# Patient Record
Sex: FEMALE | Race: BLACK OR AFRICAN AMERICAN | Employment: FULL TIME | URBAN - METROPOLITAN AREA
[De-identification: names, ages, dates, MRNs, and addresses within clinical notes are randomized per-mention and may not be internally consistent; named-entity substitution may affect disease eponyms.]

---

## 2020-11-01 ENCOUNTER — HOSPITAL ENCOUNTER (EMERGENCY)
Age: 25
Discharge: HOME | End: 2020-11-01
Attending: EMERGENCY MEDICINE

## 2020-11-01 VITALS
WEIGHT: 190 LBS | TEMPERATURE: 98.5 F | HEIGHT: 63 IN | SYSTOLIC BLOOD PRESSURE: 152 MMHG | HEART RATE: 78 BPM | DIASTOLIC BLOOD PRESSURE: 100 MMHG | OXYGEN SATURATION: 99 % | BODY MASS INDEX: 33.66 KG/M2 | RESPIRATION RATE: 16 BRPM

## 2020-11-01 DIAGNOSIS — N93.9 ABNORMAL UTERINE BLEEDING: ICD-10-CM

## 2020-11-01 DIAGNOSIS — Z87.42 HISTORY OF OVARIAN CYST: ICD-10-CM

## 2020-11-01 DIAGNOSIS — R10.2 PELVIC PAIN: Primary | ICD-10-CM

## 2020-11-01 LAB
APPEARANCE UR: NORMAL
BILIRUB UR QL: NEGATIVE
COLOR UR: YELLOW
GLUCOSE UR STRIP.AUTO-MCNC: NEGATIVE MG/DL
HCG UR QL: NEGATIVE
HGB UR QL STRIP: NEGATIVE
KETONES UR QL STRIP.AUTO: NEGATIVE MG/DL
LEUKOCYTE ESTERASE UR QL STRIP.AUTO: NEGATIVE
NITRITE UR QL STRIP.AUTO: NEGATIVE
PH UR STRIP: 5.5 [PH] (ref 5–8)
PROT UR STRIP-MCNC: NEGATIVE MG/DL
SP GR UR REFRACTOMETRY: 1.01 (ref 1–1.03)
UROBILINOGEN UR QL STRIP.AUTO: 0.2 EU/DL (ref 0.2–1)

## 2020-11-01 PROCEDURE — 74011250637 HC RX REV CODE- 250/637: Performed by: NURSE PRACTITIONER

## 2020-11-01 PROCEDURE — 81025 URINE PREGNANCY TEST: CPT

## 2020-11-01 PROCEDURE — 99283 EMERGENCY DEPT VISIT LOW MDM: CPT

## 2020-11-01 PROCEDURE — 81003 URINALYSIS AUTO W/O SCOPE: CPT

## 2020-11-01 RX ORDER — NAPROXEN 250 MG/1
500 TABLET ORAL
Status: COMPLETED | OUTPATIENT
Start: 2020-11-01 | End: 2020-11-01

## 2020-11-01 RX ORDER — NAPROXEN 500 MG/1
500 TABLET ORAL
Qty: 20 TAB | Refills: 0 | Status: SHIPPED | OUTPATIENT
Start: 2020-11-01

## 2020-11-01 RX ADMIN — NAPROXEN 500 MG: 250 TABLET ORAL at 11:30

## 2020-11-01 NOTE — ED TRIAGE NOTES
IUD removed on 9/15/2020, had a cycle on 10/1/2020, abdominal pain onset 19 days, was told she had a cyst on her L ovary. Today is being seen for pain all over the abdomen. Denies N/V/D

## 2020-11-01 NOTE — ED NOTES
Pt denies pain at this time. I have reviewed discharge instructions with the patient. The patient verbalized understanding. Pt walked to [facility] and d/c to home.

## 2020-11-01 NOTE — DISCHARGE INSTRUCTIONS
Patient Education        Pelvic Pain: Care Instructions  Your Care Instructions     Pelvic pain, or pain in the lower belly, can have many causes. Often pelvic pain is not serious and gets better in a few days. If your pain continues or gets worse, you may need tests and treatment. Tell your doctor about any new symptoms. These may be signs of a serious problem. Follow-up care is a key part of your treatment and safety. Be sure to make and go to all appointments, and call your doctor if you are having problems. It's also a good idea to know your test results and keep a list of the medicines you take. How can you care for yourself at home? · Rest until you feel better. Lie down, and raise your legs by placing a pillow under your knees. · Drink plenty of fluids. You may find that small, frequent sips are easier on your stomach than if you drink a lot at once. Avoid drinks with carbonation or caffeine, such as soda pop, tea, or coffee. · Try eating several small meals instead of 2 or 3 large ones. Eat mild foods, such as rice, dry toast or crackers, bananas, and applesauce. Avoid fatty and spicy foods, other fruits, and alcohol until 48 hours after your symptoms have gone away. · Take an over-the-counter pain medicine, such as acetaminophen (Tylenol), ibuprofen (Advil, Motrin), or naproxen (Aleve). Read and follow all instructions on the label. · Do not take two or more pain medicines at the same time unless the doctor told you to. Many pain medicines have acetaminophen, which is Tylenol. Too much acetaminophen (Tylenol) can be harmful. · You can put a heating pad, a warm cloth, or moist heat on your belly to relieve pain. When should you call for help?    Call your doctor now or seek immediate medical care if:    · You have a new or higher fever.     · You have unusual vaginal bleeding.     · You have new or worse belly or pelvic pain.     · You have vaginal discharge that has increased in amount or smells bad.   Watch closely for changes in your health, and be sure to contact your doctor if:    · You do not get better as expected. Where can you learn more? Go to http://www.gray.com/  Enter B514 in the search box to learn more about \"Pelvic Pain: Care Instructions. \"  Current as of: November 8, 2019               Content Version: 12.6  © 9928-1467 [employer]. Care instructions adapted under license by [employer] (which disclaims liability or warranty for this information). If you have questions about a medical condition or this instruction, always ask your healthcare professional. [place] [address] any warranty or liability for your use of this information.

## 2020-11-01 NOTE — ED NOTES
Assumed care of pt at this time. Pt resting in bed. Provider Michelle Yarbrough at bedside to see pt. Call bell within reach. Will continue to montior.

## 2020-11-01 NOTE — ED PROVIDER NOTES
EMERGENCY DEPARTMENT HISTORY AND PHYSICAL EXAM    Date: 11/1/2020  Patient Name: Pasquale Otero    History of Presenting Illness     Chief Complaint   Patient presents with    Abdominal Pain         History Provided By: Patient      Additional History (Context): Pasquale Otero is a 64-year-old female with past medical history significant for asthma who presents to the ER with diffuse pelvic pain that she has had for the last 2 to 3 weeks. She does state that she had her IUD removed due to irregular vaginal bleeding on September 15. At that time her OB/GYN did an ultrasound and stated that she had a small cyst on the left side of her ovary. She states she had a menstrual cycle on October 1 that was a little heavier than she was used to and now feels like she could be about to start her menses again. She is just having cramping in the lower belly bilaterally. She denies associated nausea, vomiting, diarrhea, or constipation. She denies any urinary complaints or vaginal discharge. She denies any chance of STD but is concerned she may be pregnant as the only birth control she has been using is condoms occasionally. She has tried Tylenol for pain without much improvement. PCP: Spencer Paz MD    Current Outpatient Medications   Medication Sig Dispense Refill    naproxen (Naprosyn) 500 mg tablet Take 1 Tab by mouth two (2) times daily as needed for Pain for up to 20 doses. 20 Tab 0    albuterol (PROVENTIL HFA, VENTOLIN HFA, PROAIR HFA) 90 mcg/actuation inhaler Take 1 Puff by inhalation every six (6) hours as needed for Wheezing. 1 Inhaler 0    fluticasone (FLONASE) 50 mcg/actuation nasal spray 2 Sprays by Both Nostrils route daily.  1 Bottle 0       Past History     Past Medical History:  Past Medical History:   Diagnosis Date    Asthma     Head ache     Iron (Fe) deficiency anemia     Sickle cell trait (HCC)     Sickle cell trait ([address]        Past Surgical History:  Past Surgical History: Procedure Laterality Date    HX  SECTION         Family History:  History reviewed. No pertinent family history. Social History:  Social History     Tobacco Use    Smoking status: Never Smoker    Smokeless tobacco: Never Used   Substance Use Topics    Alcohol use: No    Drug use: No       Allergies:  No Known Allergies      Review of Systems     Review of Systems   Constitutional: Negative for chills and fever. HENT: Negative for nasal congestion, sore throat, rhinorrhea  Eyes: Negative. Respiratory: negative  cough and negative for shortness of breath. Cardiovascular: Negative for chest pain and palpitations. Gastrointestinal: Positive for bilateral pelvic pain. Negative for constipation, diarrhea, nausea and vomiting. Genitourinary: Negative for difficulty urinating, hematuria, and flank pain. Musculoskeletal: Negative for back pain. Negative for gait problem and neck pain. Skin: Negative for rash. Allergic/Immunologic: Negative. Neurological: Negative for dizziness, weakness, numbness and headaches. Psychiatric/Behavioral: Negative. All other systems reviewed and are negative. All Other Systems Negative  Physical Exam     Vitals:    20 1009   BP: (!) 152/100   Pulse: 78   Resp: 16   Temp: 98.5 °F (36.9 °C)   SpO2: 99%   Weight: 86.2 kg (190 lb)   Height: 5' 3\" (1.6 m)     Physical Exam  Vitals signs and nursing note reviewed. Constitutional:       General: She is not in acute distress. Appearance: Normal appearance. She is well-developed. She is obese. She is not ill-appearing, toxic-appearing or diaphoretic. HENT:      Head: Normocephalic and atraumatic. Nose: Nose normal.      Mouth/Throat:      Mouth: Mucous membranes are moist.      Pharynx: Oropharynx is clear. No pharyngeal swelling or oropharyngeal exudate. Eyes:      General: Lids are normal. Vision grossly intact. No scleral icterus.      Conjunctiva/sclera: Conjunctivae normal.   Neck: Musculoskeletal: Full passive range of motion without pain and normal range of motion. Cardiovascular:      Rate and Rhythm: Normal rate and regular rhythm. Heart sounds: Normal heart sounds. No murmur. No friction rub. No gallop. Pulmonary:      Effort: Pulmonary effort is normal. No respiratory distress. Breath sounds: Normal breath sounds. No stridor. No wheezing, rhonchi or rales. Chest:      Chest wall: No tenderness. Abdominal:      General: Bowel sounds are normal. There is no distension or abdominal bruit. Palpations: Abdomen is soft. There is no shifting dullness, fluid wave, hepatomegaly, splenomegaly, mass or pulsatile mass. Tenderness: There is abdominal tenderness in the suprapubic area. There is no right CVA tenderness, left CVA tenderness, guarding or rebound. Negative signs include Xiao's sign, Rovsing's sign, McBurney's sign, psoas sign and obturator sign. Hernia: No hernia is present. Musculoskeletal: Normal range of motion. Skin:     General: Skin is warm and dry. Capillary Refill: Capillary refill takes less than 2 seconds. Findings: No rash. Neurological:      General: No focal deficit present. Mental Status: She is alert and oriented to person, place, and time. Psychiatric:         Mood and Affect: Mood normal.         Behavior: Behavior normal. Behavior is cooperative.            Diagnostic Study Results     Labs -     Recent Results (from the past 12 hour(s))   URINALYSIS W/ RFLX MICROSCOPIC    Collection Time: 11/01/20 10:14 AM   Result Value Ref Range    Color YELLOW      Appearance TURBID      Specific gravity 1.013 1.005 - 1.030      pH (UA) 5.5 5.0 - 8.0      Protein Negative NEG mg/dL    Glucose Negative NEG mg/dL    Ketone Negative NEG mg/dL    Bilirubin Negative NEG      Blood Negative NEG      Urobilinogen 0.2 0.2 - 1.0 EU/dL    Nitrites Negative NEG      Leukocyte Esterase Negative NEG     HCG URINE, QL    Collection Time: 11/01/20 10:14 AM   Result Value Ref Range    HCG urine, QL Negative NEG         Radiologic Studies -   No orders to display     CT Results  (Last 48 hours)    None        CXR Results  (Last 48 hours)    None            Medical Decision Making   I am the first provider for this patient. I reviewed the vital signs, available nursing notes, past medical history, past surgical history, family history and social history. Vital Signs-Reviewed the patient's vital signs. Records Reviewed: Nursing notes, old medical records and any previous labs, imaging, visits, consultations pertinent to patient care    Procedures:  Procedures      ED Course: Progress Notes, Reevaluation, and Consults:  11:36 AM  Initial assessment performed. The patients presenting problems have been discussed, and they/their family are in agreement with the care plan formulated and outlined with them. I have encouraged them to ask questions as they arise throughout their visit. Provider Notes (Medical Decision Making):   Patient presents ambulatory in no acute distress, well-hydrated, non-toxic in appearance, and afebrile. Blood pressure is elevated. Benign exam of abdomen with very mild pain in the suprapubic region and no peritoneal signs. No palpable masses in the abdomen. No significant pain over the adnexa bilaterally. Urinalysis and hCG are negative. She is tolerating PO well. Patient appears well and comfortable. Doubt acute surgical process. Repeat abdominal exam reveals soft and non-tender abdomen. No new symptoms on re-evaluation. Patient has improvement in pain. Will discharge home with supportive treatment and close follow-up with OB/GYN in 2-3 days. MED RECONCILIATION:  No current facility-administered medications for this encounter. Current Outpatient Medications   Medication Sig    naproxen (Naprosyn) 500 mg tablet Take 1 Tab by mouth two (2) times daily as needed for Pain for up to 20 doses.  albuterol (PROVENTIL HFA, VENTOLIN HFA, PROAIR HFA) 90 mcg/actuation inhaler Take 1 Puff by inhalation every six (6) hours as needed for Wheezing.  fluticasone (FLONASE) 50 mcg/actuation nasal spray 2 Sprays by Both Nostrils route daily. Disposition:  Home in stable condition. DISCHARGE NOTE:     Patient has been reexamined. Patient has no new complaints, changes, or physical findings. Patient demonstrates understanding of current diagnoses and is in agreement with the treatment plan. They are advised that while the likelihood of serious underlying condition is low at this point given the evaluation performed today, we cannot fully rule it out. They are advised to immediately return with any new symptoms or worsening of current condition. Care plan outlined and precautions discussed. Discussed proper way to take medications. Medication use, risk/benefit, side effects and precautions discussed in detail. Discussed treatment plan, return precautions, symptomatic relief, and expected time to improvement. All questions answered. Patient is stable for discharge and outpatient management. Patient is ready to go home. Follow-up Information     Follow up With Specialties Details Why Contact Info    [facility] Department of OB/GYN  Schedule an appointment as soon as possible for a visit Follow-up from the Emergency Department [facility], [address] [facility] [address] [facility] EMERGENCY DEPT Emergency Medicine  As needed, If symptoms worsen [address] [facility].  [phone]          Current Discharge Medication List      START taking these medications    Details   naproxen (Naprosyn) 500 mg tablet Take 1 Tab by mouth two (2) times daily as needed for Pain for up to 20 doses.   Qty: 20 Tab, Refills: 0         CONTINUE these medications which have NOT CHANGED    Details   albuterol (PROVENTIL HFA, VENTOLIN HFA, PROAIR HFA) 90 mcg/actuation inhaler Take 1 Puff by inhalation every six (6) hours as needed for Wheezing. Qty: 1 Inhaler, Refills: 0      fluticasone (FLONASE) 50 mcg/actuation nasal spray 2 Sprays by Both Nostrils route daily. Qty: 1 Bottle, Refills: 0                   Diagnosis     Clinical Impression:   1. Pelvic pain    2. History of ovarian cyst    3. Abnormal uterine bleeding        Dictation disclaimer:  Please note that this dictation was completed with [employer], the computer voice recognition software. Quite often unanticipated grammatical, syntax, homophones, and other interpretive errors are inadvertently transcribed by the computer software. Please disregard these errors. Please excuse any errors that have escaped final proofreading.

## 2021-02-02 ENCOUNTER — APPOINTMENT (OUTPATIENT)
Dept: GENERAL RADIOLOGY | Age: 26
End: 2021-02-02
Attending: EMERGENCY MEDICINE

## 2021-02-02 ENCOUNTER — HOSPITAL ENCOUNTER (EMERGENCY)
Age: 26
Discharge: HOME | End: 2021-02-02
Attending: EMERGENCY MEDICINE

## 2021-02-02 VITALS
TEMPERATURE: 98.2 F | OXYGEN SATURATION: 100 % | SYSTOLIC BLOOD PRESSURE: 127 MMHG | BODY MASS INDEX: 34.2 KG/M2 | DIASTOLIC BLOOD PRESSURE: 80 MMHG | HEART RATE: 65 BPM | RESPIRATION RATE: 16 BRPM | HEIGHT: 63 IN | WEIGHT: 193 LBS

## 2021-02-02 DIAGNOSIS — J20.9 ACUTE BRONCHITIS, UNSPECIFIED ORGANISM: ICD-10-CM

## 2021-02-02 DIAGNOSIS — Z20.822 SUSPECTED 2019 NOVEL CORONAVIRUS INFECTION: Primary | ICD-10-CM

## 2021-02-02 LAB
HCG UR QL: POSITIVE
SARS-COV-2, COV2: NORMAL

## 2021-02-02 PROCEDURE — U0003 INFECTIOUS AGENT DETECTION BY NUCLEIC ACID (DNA OR RNA); SEVERE ACUTE RESPIRATORY SYNDROME CORONAVIRUS 2 (SARS-COV-2) (CORONAVIRUS DISEASE [COVID-19]), AMPLIFIED PROBE TECHNIQUE, MAKING USE OF HIGH THROUGHPUT TECHNOLOGIES AS DESCRIBED BY CMS-2020-01-R: HCPCS

## 2021-02-02 PROCEDURE — 81025 URINE PREGNANCY TEST: CPT

## 2021-02-02 PROCEDURE — 93005 ELECTROCARDIOGRAM TRACING: CPT

## 2021-02-02 PROCEDURE — 74011000250 HC RX REV CODE- 250: Performed by: EMERGENCY MEDICINE

## 2021-02-02 PROCEDURE — 99284 EMERGENCY DEPT VISIT MOD MDM: CPT

## 2021-02-02 PROCEDURE — 71045 X-RAY EXAM CHEST 1 VIEW: CPT

## 2021-02-02 PROCEDURE — 74011250637 HC RX REV CODE- 250/637: Performed by: EMERGENCY MEDICINE

## 2021-02-02 RX ORDER — ALBUTEROL SULFATE 2.5 MG/.5ML
5 SOLUTION RESPIRATORY (INHALATION)
Status: DISCONTINUED | OUTPATIENT
Start: 2021-02-02 | End: 2021-02-02 | Stop reason: HOSPADM

## 2021-02-02 RX ORDER — DEXAMETHASONE SODIUM PHOSPHATE 4 MG/ML
10 INJECTION, SOLUTION INTRA-ARTICULAR; INTRALESIONAL; INTRAMUSCULAR; INTRAVENOUS; SOFT TISSUE
Status: COMPLETED | OUTPATIENT
Start: 2021-02-02 | End: 2021-02-02

## 2021-02-02 RX ORDER — MONTELUKAST SODIUM 10 MG/1
10 TABLET ORAL DAILY
COMMUNITY

## 2021-02-02 RX ORDER — ALBUTEROL SULFATE 90 UG/1
1 AEROSOL, METERED RESPIRATORY (INHALATION)
Qty: 1 INHALER | Refills: 0 | Status: SHIPPED | OUTPATIENT
Start: 2021-02-02

## 2021-02-02 RX ADMIN — ALBUTEROL SULFATE 5 MG: 2.5 SOLUTION RESPIRATORY (INHALATION) at 10:51

## 2021-02-02 RX ADMIN — DEXAMETHASONE SODIUM PHOSPHATE 10 MG: 4 INJECTION, SOLUTION INTRAMUSCULAR; INTRAVENOUS at 10:51

## 2021-02-02 NOTE — Clinical Note
[address] [facility] EMERGENCY DEPT 
[address] 
[address] [facility] [address] [phone] Work/School Note Date: 2/2/2021 To Whom It May concern: 
 
 
Roddy Cavazos was seen and treated today in the emergency room by the following provider(s): 
Attending Provider: Vitaliy Guthrie MD.   
 
Roddy Cavazos is excused from work/school on 02/02/21. She is clear to return to work/school on 02/03/21. Sincerely, Carter Hassan MD

## 2021-02-02 NOTE — ED PROVIDER NOTES
EMERGENCY DEPARTMENT HISTORY AND PHYSICAL EXAM    10:26 AM some crowding and staffing challenges but not bed. Patient seen in the triage room. Date: 2/2/2021  Patient Name: Roddy Cavazos    History of Presenting Illness     Chief Complaint   Patient presents with    Cough    Shortness of Breath    Chest Pain    Generalized Body Aches         History Provided By: patient    Additional History (Context): Roddy Cavazos is a 26 y.o. female presents with suspect she may be a month pregnant, diagnosed with asthma and COPD, has 2 days of chills body aches. Also cough feels short of breath when she walks some chest pain with breathing. She does have exposure to a coworker with coronavirus. No medications tried. Chest pain is mild    Family member with sickle cell she does not have. No history of PEs or ACS. Deloris Blum PCP: Trinidad Diaz MD    Chief Complaint:   Duration:    Timing:    Location:   Quality:   Severity:   Modifying Factors:   Associated Symptoms:       Current Facility-Administered Medications   Medication Dose Route Frequency Provider Last Rate Last Admin    albuterol CONCENTRATE 2.5mg/0.5 mL neb soln  5 mg Nebulization Q20MIN PRN Earl HICKS MD   5 mg at 02/02/21 1051     Current Outpatient Medications   Medication Sig Dispense Refill    montelukast (Singulair) 10 mg tablet Take 10 mg by mouth daily.  albuterol (PROVENTIL HFA, VENTOLIN HFA, PROAIR HFA) 90 mcg/actuation inhaler Take 1 Puff by inhalation every six (6) hours as needed for Wheezing. 1 Inhaler 0    naproxen (Naprosyn) 500 mg tablet Take 1 Tab by mouth two (2) times daily as needed for Pain for up to 20 doses. 20 Tab 0    fluticasone (FLONASE) 50 mcg/actuation nasal spray 2 Sprays by Both Nostrils route daily.  1 Bottle 0       Past History     Past Medical History:  Past Medical History:   Diagnosis Date    Asthma     Head ache     Iron (Fe) deficiency anemia     Sickle cell trait (HCC)     Sickle cell trait [facility])        Past Surgical History:  Past Surgical History:   Procedure Laterality Date    HX  SECTION         Family History:  History reviewed. No pertinent family history. Social History:  Social History     Tobacco Use    Smoking status: Never Smoker    Smokeless tobacco: Never Used   Substance Use Topics    Alcohol use: No    Drug use: No       Allergies:  No Known Allergies      Review of Systems     Review of Systems   Constitutional: Negative for diaphoresis and fever. HENT: Negative for congestion and sore throat. Eyes: Negative for pain and itching. Respiratory: Positive for cough and shortness of breath. Cardiovascular: Negative for chest pain and palpitations. Gastrointestinal: Negative for abdominal pain and diarrhea. Endocrine: Negative for polydipsia and polyuria. Genitourinary: Negative for dysuria and hematuria. Musculoskeletal: Positive for myalgias. Negative for arthralgias. Skin: Negative for rash and wound. Neurological: Negative for seizures and syncope. Hematological: Does not bruise/bleed easily. Psychiatric/Behavioral: Negative for agitation and hallucinations. Physical Exam       Patient Vitals for the past 12 hrs:   Temp Pulse Resp BP SpO2   21 1119    128/74 100 %   21 1019 98.2 °F (36.8 °C) 65 16 134/82 100 %       Physical Exam  Vitals signs and nursing note reviewed. Constitutional:       General: She is in acute distress (Appears tired few coughs). Appearance: She is well-developed. She is obese. HENT:      Head: Normocephalic and atraumatic. Eyes:      General: No scleral icterus. Conjunctiva/sclera: Conjunctivae normal.   Neck:      Musculoskeletal: Normal range of motion and neck supple. Vascular: No JVD. Cardiovascular:      Rate and Rhythm: Normal rate and regular rhythm. Heart sounds: Normal heart sounds.       Comments: 4 intact extremity pulses  Pulmonary:      Effort: Pulmonary effort is normal.      Breath sounds: Wheezing present. Comments: Prolonged expiratory phase  Abdominal:      Palpations: Abdomen is soft. There is no mass. Tenderness: There is no abdominal tenderness. Musculoskeletal: Normal range of motion. Lymphadenopathy:      Cervical: No cervical adenopathy. Skin:     General: Skin is warm and dry. Neurological:      Mental Status: She is alert. Diagnostic Study Results   Labs -  Recent Results (from the past 12 hour(s))   EKG, 12 LEAD, INITIAL    Collection Time: 02/02/21 10:10 AM   Result Value Ref Range    Ventricular Rate 65 BPM    Atrial Rate 65 BPM    P-R Interval 158 ms    QRS Duration 80 ms    Q-T Interval 380 ms    QTC Calculation (Bezet) 395 ms    Calculated P Axis 42 degrees    Calculated R Axis -59 degrees    Calculated T Axis 3 degrees    Diagnosis       Normal sinus rhythm  Left axis deviation  Abnormal ECG  When compared with ECG of 12-JAN-2014 14:36,  Inverted T waves have replaced nonspecific T wave abnormality in Inferior   leads     HCG URINE, QL    Collection Time: 02/02/21 10:26 AM   Result Value Ref Range    HCG urine, QL Positive (A) NEG         Radiologic Studies -   XR CHEST PORT   Final Result      No active cardiopulmonary disease. Xr Chest Port    Result Date: 2/2/2021  EXAM: CHEST RADIOGRAPH CLINICAL INDICATION/HISTORY: Cough chills coronavirus? Possible pregnancy   > Additional: None COMPARISON: 10/28/2015. TECHNIQUE: Portable frontal view of the chest _______________ FINDINGS: SUPPORT DEVICES: None. HEART AND MEDIASTINUM: No appreciable cardiomegaly. Remaining mediastinal contours within normal limits. LUNGS AND PLEURAL SPACES: Clear. No consolidation, mass or effusion. BONY THORAX AND SOFT TISSUES: Unremarkable. _______________     No active cardiopulmonary disease.       Medications ordered:   Medications   albuterol CONCENTRATE 2.5mg/0.5 mL neb soln (5 mg Nebulization Given 2/2/21 1051)   dexamethasone (DECADRON) 4 mg/mL Oral 10 mg (10 mg Oral Given 2/2/21 1051)         Medical Decision Making   Initial Medical Decision Making and DDx:  Twelve-lead EKG, inverted T waves in the inferior leads, sinus rhythm at 65. Strongly suspicious for coronavirus, check pregnancy test, treat for bronchospasm, otherwise viral syndrome. Doubt ACS CHF PE aortic disease. Bacterial pneumonia is possible    ED Course: Progress Notes, Reevaluation, and Consults:     12:35 PM Pt reevaluated at this time. Discussed results and findings, as well as, diagnosis and plan for discharge. Follow up with doctors/services listed. Return to the emergency department for alarming symptoms. Pt verbalizes understanding and agreement with plan. All questions addressed. Received 1 breathing treatment she does feel better listen to her lungs and they are now clear she still feels kind of bad because of the body aches noted positive pregnancy test use Tylenol for any body aches avoid ibuprofen, Decadron will last for 5 days, treat asthma aggressively during pregnancy the same as nonpregnant. She will follow-up with primary care and GYN. Happy with the plan ready for discharge. Work note. I am the first provider for this patient. I reviewed the vital signs, available nursing notes, past medical history, past surgical history, family history and social history. Patient Vitals for the past 12 hrs:   Temp Pulse Resp BP SpO2   02/02/21 1119    128/74 100 %   02/02/21 1019 98.2 °F (36.8 °C) 65 16 134/82 100 %       Vital Signs-Reviewed the patient's vital signs. Pulse Oximetry Analysis, Cardiac Monitor, 12 lead ekg: No hypoxia on room air  Interpreted by the EP. Records Reviewed: Nursing notes reviewed (Time of Review: 10:26 AM)    Procedures:   Critical Care Time:   Aspirin: (was aspirin given for stroke?)    Diagnosis     Clinical Impression:   1. Suspected 2019 novel coronavirus infection    2.  Acute bronchitis, unspecified organism Disposition: Discharged      Follow-up Information     Follow up With Specialties Details Why Contact Info    Nichole Jackson MD Family Medicine In 1 week  [address]  [phone]             Current Discharge Medication List      CONTINUE these medications which have CHANGED    Details   albuterol (PROVENTIL HFA, VENTOLIN HFA, PROAIR HFA) 90 mcg/actuation inhaler Take 1 Puff by inhalation every six (6) hours as needed for Wheezing. Qty: 1 Inhaler, Refills: 0         CONTINUE these medications which have NOT CHANGED    Details   montelukast (Singulair) 10 mg tablet Take 10 mg by mouth daily. naproxen (Naprosyn) 500 mg tablet Take 1 Tab by mouth two (2) times daily as needed for Pain for up to 20 doses. Qty: 20 Tab, Refills: 0      fluticasone (FLONASE) 50 mcg/actuation nasal spray 2 Sprays by Both Nostrils route daily.   Qty: 1 Bottle, Refills: 0           _______________________________    Notes:    Florin Altman MD using Dragon dictation      _______________________________

## 2021-02-02 NOTE — ED NOTES
I have reviewed discharge instructions with the patient. The patient verbalized understanding. Covid swab sent to lab with label.

## 2021-02-03 ENCOUNTER — PATIENT OUTREACH (OUTPATIENT)
Dept: CASE MANAGEMENT | Age: 26
End: 2021-02-03

## 2021-02-03 LAB
ATRIAL RATE: 65 BPM
CALCULATED P AXIS, ECG09: 42 DEGREES
CALCULATED R AXIS, ECG10: -59 DEGREES
CALCULATED T AXIS, ECG11: 3 DEGREES
DIAGNOSIS, 93000: NORMAL
P-R INTERVAL, ECG05: 158 MS
Q-T INTERVAL, ECG07: 380 MS
QRS DURATION, ECG06: 80 MS
QTC CALCULATION (BEZET), ECG08: 395 MS
SARS-COV-2, COV2NT: NOT DETECTED
VENTRICULAR RATE, ECG03: 65 BPM

## 2021-02-06 ENCOUNTER — APPOINTMENT (OUTPATIENT)
Dept: ULTRASOUND IMAGING | Age: 26
End: 2021-02-06
Attending: PHYSICIAN ASSISTANT

## 2021-02-06 ENCOUNTER — HOSPITAL ENCOUNTER (EMERGENCY)
Age: 26
Discharge: HOME | End: 2021-02-06
Attending: STUDENT IN AN ORGANIZED HEALTH CARE EDUCATION/TRAINING PROGRAM

## 2021-02-06 VITALS
TEMPERATURE: 98.2 F | OXYGEN SATURATION: 97 % | DIASTOLIC BLOOD PRESSURE: 75 MMHG | WEIGHT: 202 LBS | BODY MASS INDEX: 35.79 KG/M2 | SYSTOLIC BLOOD PRESSURE: 125 MMHG | RESPIRATION RATE: 20 BRPM | HEIGHT: 63 IN | HEART RATE: 91 BPM

## 2021-02-06 DIAGNOSIS — O20.0 THREATENED MISCARRIAGE IN EARLY PREGNANCY: Primary | ICD-10-CM

## 2021-02-06 DIAGNOSIS — O20.9 VAGINAL BLEEDING AFFECTING EARLY PREGNANCY: ICD-10-CM

## 2021-02-06 LAB
APPEARANCE UR: CLEAR
BACTERIA URNS QL MICRO: NEGATIVE /HPF
BASOPHILS # BLD: 0 K/UL (ref 0–0.1)
BASOPHILS NFR BLD: 0 % (ref 0–2)
BILIRUB UR QL: NEGATIVE
COLOR UR: YELLOW
DIFFERENTIAL METHOD BLD: NORMAL
EOSINOPHIL # BLD: 0.2 K/UL (ref 0–0.4)
EOSINOPHIL NFR BLD: 2 % (ref 0–5)
EPITH CASTS URNS QL MICRO: NORMAL /LPF (ref 0–5)
ERYTHROCYTE [DISTWIDTH] IN BLOOD BY AUTOMATED COUNT: 12.4 % (ref 11.6–14.5)
GLUCOSE UR STRIP.AUTO-MCNC: NEGATIVE MG/DL
HCG SERPL-ACNC: 7573 MIU/ML (ref 0–10)
HCT VFR BLD AUTO: 41.2 % (ref 35–45)
HGB BLD-MCNC: 14 G/DL (ref 12–16)
HGB UR QL STRIP: ABNORMAL
KETONES UR QL STRIP.AUTO: ABNORMAL MG/DL
LEUKOCYTE ESTERASE UR QL STRIP.AUTO: NEGATIVE
LYMPHOCYTES # BLD: 2.6 K/UL (ref 0.9–3.6)
LYMPHOCYTES NFR BLD: 27 % (ref 21–52)
MCH RBC QN AUTO: 29.4 PG (ref 24–34)
MCHC RBC AUTO-ENTMCNC: 34 G/DL (ref 31–37)
MCV RBC AUTO: 86.6 FL (ref 74–97)
MONOCYTES # BLD: 0.9 K/UL (ref 0.05–1.2)
MONOCYTES NFR BLD: 9 % (ref 3–10)
NEUTS SEG # BLD: 6 K/UL (ref 1.8–8)
NEUTS SEG NFR BLD: 62 % (ref 40–73)
NITRITE UR QL STRIP.AUTO: NEGATIVE
PH UR STRIP: 7 [PH] (ref 5–8)
PLATELET # BLD AUTO: 244 K/UL (ref 135–420)
PMV BLD AUTO: 11 FL (ref 9.2–11.8)
PROT UR STRIP-MCNC: NEGATIVE MG/DL
RBC # BLD AUTO: 4.76 M/UL (ref 4.2–5.3)
RBC #/AREA URNS HPF: NORMAL /HPF (ref 0–5)
SP GR UR REFRACTOMETRY: 1.01 (ref 1–1.03)
UROBILINOGEN UR QL STRIP.AUTO: 1 EU/DL (ref 0.2–1)
WBC # BLD AUTO: 9.7 K/UL (ref 4.6–13.2)
WBC URNS QL MICRO: NORMAL /HPF (ref 0–4)

## 2021-02-06 PROCEDURE — 84702 CHORIONIC GONADOTROPIN TEST: CPT

## 2021-02-06 PROCEDURE — 93975 VASCULAR STUDY: CPT

## 2021-02-06 PROCEDURE — 99284 EMERGENCY DEPT VISIT MOD MDM: CPT

## 2021-02-06 PROCEDURE — 85025 COMPLETE CBC W/AUTO DIFF WBC: CPT

## 2021-02-06 PROCEDURE — 81001 URINALYSIS AUTO W/SCOPE: CPT

## 2021-02-06 NOTE — DISCHARGE INSTRUCTIONS
BEDREST ONLY. NOTHING IN YOUR VAGINA UNTIL YOU GET CLEARANCE FROM OB-GYN at [address]. FOLLOW HERE OR WITH OB-GYN IN 2 DAYS TO HAVE YOUR PREGNANCY HORMONES CHECK WITHOUT FAIL. Your hCG today, 2/6/2021, is 7,573. 2/2/2021 it was 3,814  Continue prenatal vitamins. Take Tylenol (every 4-6 hours) for pain as needed. Return to emergency room IMMEDIATELY for worsening or new symptoms. Drink plenty of fluids.

## 2021-02-06 NOTE — ED PROVIDER NOTES
EMERGENCY DEPARTMENT HISTORY AND PHYSICAL EXAM    Date: 2021  Patient Name: Lex Andrade    History of Presenting Illness     Chief Complaint:   Chief Complaint   Patient presents with    Pregnancy Problem     History Provided By: Patient    Additional History (Context): Lex Andrade is a 26 y.o. female who is  presents ambulatory c/o vaginal spotting. Pt denies cramping. Pt LMP , OB appt 21 at [facility]. S3P2  Pt was seen in ED here and at [facility] 2021. It is unclear why she was seen in two ERs on the same day. She was tested for COVID here and is negative, had neg CXR, had positive preg test    2021 [facility]: hCG quant 3814, ABO RH is A POSITIVE, wet prep and GC/Chlamydia negative  2021 US <14 wks preg detects small possible early gestational sac seen within the uterus, size compatible with 5 weeks gestation. No fetal pole or yolk sac seen. Recommend continued sonographic and laboratory follow-up. Probable right corpus luteum    PCP: Dawson Guerra MD    Current Outpatient Medications   Medication Sig Dispense Refill    montelukast (Singulair) 10 mg tablet Take 10 mg by mouth daily.  albuterol (PROVENTIL HFA, VENTOLIN HFA, PROAIR HFA) 90 mcg/actuation inhaler Take 1 Puff by inhalation every six (6) hours as needed for Wheezing. 1 Inhaler 0    naproxen (Naprosyn) 500 mg tablet Take 1 Tab by mouth two (2) times daily as needed for Pain for up to 20 doses. 20 Tab 0    fluticasone (FLONASE) 50 mcg/actuation nasal spray 2 Sprays by Both Nostrils route daily. 1 Bottle 0       Past History     Past Medical History:  Past Medical History:   Diagnosis Date    Asthma     Head ache     Iron (Fe) deficiency anemia     Sickle cell trait (HCC)     Sickle cell trait (HCC)        Past Surgical History:  Past Surgical History:   Procedure Laterality Date    HX  SECTION         Family History:  History reviewed. No pertinent family history.     Social History:  Social History     Tobacco Use    Smoking status: Never Smoker    Smokeless tobacco: Never Used   Substance Use Topics    Alcohol use: No    Drug use: No       Allergies:  No Known Allergies      Review of Systems   Review of Systems   Constitutional: Negative for activity change, appetite change, chills and fever. Eyes: Negative. Respiratory: Negative. Cardiovascular: Negative. Gastrointestinal: Negative for abdominal pain, diarrhea, nausea and vomiting. Genitourinary: Positive for vaginal bleeding. Negative for dysuria, frequency, pelvic pain and vaginal discharge. Musculoskeletal: Negative. Neurological: Negative for dizziness, syncope and headaches. All other systems reviewed and are negative. All Other Systems Negative  Physical Exam     Vitals:    02/06/21 1320   BP: 125/75   Pulse: 91   Resp: 20   Temp: 98.2 °F (36.8 °C)   SpO2: 97%   Weight: 91.6 kg (202 lb)   Height: 5' 3\" (1.6 m)     Physical Exam  Vitals signs and nursing note reviewed. Constitutional:       General: She is not in acute distress. Appearance: She is well-developed. She is not toxic-appearing or diaphoretic. HENT:      Head: Normocephalic and atraumatic. Nose: Nose normal.      Mouth/Throat:      Pharynx: Uvula midline. Neck:      Musculoskeletal: Normal range of motion and neck supple. Cardiovascular:      Rate and Rhythm: Normal rate and regular rhythm. Heart sounds: Normal heart sounds. Pulmonary:      Effort: Pulmonary effort is normal.      Breath sounds: Normal breath sounds. Abdominal:      General: Bowel sounds are normal.      Palpations: Abdomen is soft. Tenderness: There is no abdominal tenderness. There is no right CVA tenderness, left CVA tenderness, guarding or rebound. Genitourinary:     Comments: The external vaginal genitalia WO rash/lesion/ulceration. no Vaginal discharge. +thin dark mild to moderate Vaginal bleeding.    Cervix is midline closed, normal pink appearance. NO CMT, adnexal mass or tenderness upon palpation. Uterus is midline, mobile, non-tender, enlarged  NO inguinal lymphadenopathy   Musculoskeletal: Normal range of motion. Lymphadenopathy:      Cervical: No cervical adenopathy. Skin:     General: Skin is warm and dry. Neurological:      Mental Status: She is alert and oriented to person, place, and time. Diagnostic Study Results     Labs -     Recent Results (from the past 12 hour(s))   BETA HCG, QT    Collection Time: 02/06/21  2:19 PM   Result Value Ref Range    Beta HCG, QT 7,573 (H) 0 - 10 MIU/ML   CBC WITH AUTOMATED DIFF    Collection Time: 02/06/21  2:19 PM   Result Value Ref Range    WBC 9.7 4.6 - 13.2 K/uL    RBC 4.76 4.20 - 5.30 M/uL    HGB 14.0 12.0 - 16.0 g/dL    HCT 41.2 35.0 - 45.0 %    MCV 86.6 74.0 - 97.0 FL    MCH 29.4 24.0 - 34.0 PG    MCHC 34.0 31.0 - 37.0 g/dL    RDW 12.4 11.6 - 14.5 %    PLATELET 577 210 - 264 K/uL    MPV 11.0 9.2 - 11.8 FL    NEUTROPHILS 62 40 - 73 %    LYMPHOCYTES 27 21 - 52 %    MONOCYTES 9 3 - 10 %    EOSINOPHILS 2 0 - 5 %    BASOPHILS 0 0 - 2 %    ABS. NEUTROPHILS 6.0 1.8 - 8.0 K/UL    ABS. LYMPHOCYTES 2.6 0.9 - 3.6 K/UL    ABS. MONOCYTES 0.9 0.05 - 1.2 K/UL    ABS. EOSINOPHILS 0.2 0.0 - 0.4 K/UL    ABS.  BASOPHILS 0.0 0.0 - 0.1 K/UL    DF AUTOMATED     URINALYSIS W/ RFLX MICROSCOPIC    Collection Time: 02/06/21  2:19 PM   Result Value Ref Range    Color YELLOW      Appearance CLEAR      Specific gravity 1.011 1.005 - 1.030      pH (UA) 7.0 5.0 - 8.0      Protein Negative NEG mg/dL    Glucose Negative NEG mg/dL    Ketone TRACE (A) NEG mg/dL    Bilirubin Negative NEG      Blood LARGE (A) NEG      Urobilinogen 1.0 0.2 - 1.0 EU/dL    Nitrites Negative NEG      Leukocyte Esterase Negative NEG     URINE MICROSCOPIC ONLY    Collection Time: 02/06/21  2:19 PM   Result Value Ref Range    WBC 0 to 3 0 - 4 /hpf    RBC 0 to 3 0 - 5 /hpf    Epithelial cells FEW 0 - 5 /lpf    Bacteria Negative NEG /hpf Radiologic Studies -   US OB < 14 WKS W DOPPLER    (Results Pending)     CT Results  (Last 48 hours)    None            Medical Decision Making   I am the first provider for this patient. I reviewed the vital signs, available nursing notes, past medical history, past surgical history, family history and social history. Vital Signs-Reviewed the patient's vital signs. Records Reviewed: Nursing Notes, Old Medical Records, Previous Radiology Studies, Previous Laboratory Studies and [facility] ED visit 2/2/2021 and other [facility] records reviewed    Procedures:  Procedures    Provider Notes (Medical Decision Making):     DDx: preg, ectopic, miscarriage, menstruation, ovarian cyst, ovarian torsion, tubo-ovarian abscess, fibroids, UTI, pyelo, kidney stones, STD,  Ikla-Dfvk-Xcuesv syndrome, hemorroids, dehydration,anemia, malignancy     2:37 PM   +mild to moderate bleeding on pelvic exam. No pain elicited, pt is 6 wks GA, will repeat US, hCG pending  CBC is nl, no anemia    PROGRESS NOTES:  4:56 PM   Awaiting US results. No complaints     PROGRESS NOTES:  5:09 PM   Wet read of ultrasound: Single IUP with visualized gestational sac and yolk sac corresponding to 5 weeks 2 days gestational age. Small subchorionic hemorrhage. Trace fluid in the cervical canal.  Right corpus luteum. Given her results from 4 days ago, 2/2/2021 by [facility], and with comparison to today's results, it is unclear if this pregnancy is viable and normally developing versus patient developing fetal demise. Explained findings to the patient. At this time will treat as threatened miscarriage, patient will follow-up with her OB/GYN or here for hCG recheck. Will return sooner for any new or worse symptoms. Discussed results, care in ED and further care, f/u and s/s warranting return to ED. Pt and family present understood and agreed to plan. Consult:    5:13 PM   Discussed care with Carlos A Boland ED attending.   Standard discussion; including history of patients chief complaint, available diagnostic results, and treatment course. PLAN: agrees with plan. Nayeli Reno PA-C       MED RECONCILIATION:  No current facility-administered medications for this encounter. Current Outpatient Medications   Medication Sig    montelukast (Singulair) 10 mg tablet Take 10 mg by mouth daily.  albuterol (PROVENTIL HFA, VENTOLIN HFA, PROAIR HFA) 90 mcg/actuation inhaler Take 1 Puff by inhalation every six (6) hours as needed for Wheezing.  naproxen (Naprosyn) 500 mg tablet Take 1 Tab by mouth two (2) times daily as needed for Pain for up to 20 doses.  fluticasone (FLONASE) 50 mcg/actuation nasal spray 2 Sprays by Both Nostrils route daily. Disposition:  home    DISCHARGE NOTE:     Pt has been reexamined. Remains stable, NAD. Patient has no new complaints, changes, or physical findings. Care plan outlined and precautions discussed. Results of labs, US were reviewed with the patient. All medications were reviewed with the patient; will d/c home. All of pt's questions and concerns were addressed. Patient was instructed and agrees to follow up with OB, as well as to return to the ED upon further deterioration. Patient is ready to go home. Follow-up Information     Follow up With Specialties Details Why Contact Info    [facility] Department of OB/GYN  On 2/11/2021 for further evaluation and treatment for this pregnancy as scheduled or sooner if possible [facility] [address], [address] [facility] EMERGENCY DEPT Emergency Medicine In 2 days for recheck of current symptoms, hCG level recheck (unless your OB-GYN can see you for that). Return immediately for any new or worse symptoms. [address]  [phone]          Current Discharge Medication List              Diagnosis     Clinical Impression:   1.  Threatened miscarriage in early pregnancy          Dictation disclaimer: Please note that this dictation was completed with [employer], the computer voice recognition software. Quite often unanticipated grammatical, syntax, homophones, and other interpretive errors are inadvertently transcribed by the computer software. Please disregard these errors. Please excuse any errors that have escaped final proofreading.

## 2021-02-06 NOTE — ED NOTES
Provider reviewed discharge and follow up instructions with the patient. The patient verbalized understanding and has OB-GYN appointment scheduled for this coming week.  Patient left unit ambulatory in no apparent distress for discharge to home

## 2021-02-16 ENCOUNTER — HOSPITAL ENCOUNTER (EMERGENCY)
Age: 26
Discharge: HOME | End: 2021-02-16
Attending: EMERGENCY MEDICINE

## 2021-02-16 VITALS
SYSTOLIC BLOOD PRESSURE: 122 MMHG | HEIGHT: 63 IN | HEART RATE: 88 BPM | WEIGHT: 195 LBS | BODY MASS INDEX: 34.55 KG/M2 | RESPIRATION RATE: 18 BRPM | OXYGEN SATURATION: 100 % | TEMPERATURE: 98.1 F | DIASTOLIC BLOOD PRESSURE: 74 MMHG

## 2021-02-16 DIAGNOSIS — Z3A.01 LESS THAN 8 WEEKS GESTATION OF PREGNANCY: ICD-10-CM

## 2021-02-16 DIAGNOSIS — Z20.822 ENCOUNTER FOR LABORATORY TESTING FOR COVID-19 VIRUS: ICD-10-CM

## 2021-02-16 DIAGNOSIS — J02.9 PHARYNGITIS, UNSPECIFIED ETIOLOGY: Primary | ICD-10-CM

## 2021-02-16 LAB — SARS-COV-2, COV2: NORMAL

## 2021-02-16 PROCEDURE — 99282 EMERGENCY DEPT VISIT SF MDM: CPT

## 2021-02-16 PROCEDURE — U0003 INFECTIOUS AGENT DETECTION BY NUCLEIC ACID (DNA OR RNA); SEVERE ACUTE RESPIRATORY SYNDROME CORONAVIRUS 2 (SARS-COV-2) (CORONAVIRUS DISEASE [COVID-19]), AMPLIFIED PROBE TECHNIQUE, MAKING USE OF HIGH THROUGHPUT TECHNOLOGIES AS DESCRIBED BY CMS-2020-01-R: HCPCS

## 2021-02-16 RX ORDER — AMOXICILLIN AND CLAVULANATE POTASSIUM 875; 125 MG/1; MG/1
1 TABLET, FILM COATED ORAL 2 TIMES DAILY
Qty: 20 TAB | Refills: 0 | Status: SHIPPED | OUTPATIENT
Start: 2021-02-16 | End: 2021-02-26

## 2021-02-16 NOTE — ED NOTES
Nasal swab performed for send out COVID. Specimen sent to lab as ordered, procedure tolerated well by pt.

## 2021-02-16 NOTE — ED NOTES
I have reviewed discharge instructions with the patient. The patient verbalized understanding. Pt discharged from ED ambulatory with steady gait noted stable in no distress.

## 2021-02-16 NOTE — ED PROVIDER NOTES
[address]  EMERGENCY DEPARTMENT HISTORY AND PHYSICAL EXAM       Date: 2021   Patient Name: Yang Martins   YOB: 1995  Medical Record Number: [id number]    HISTORY OF PRESENTING ILLNESS:     Yang Martins is a 26 y.o. female presenting with the noted PMH to the ED c/o sore throat. Patient states started yesterday. Constant. With no increasing or decreasing factors. She states is more on the left side. Worse when she swallows. Denies any fevers or chills. Denies any nausea or vomiting. Denies any nasal congestion. Denies any chest pain or shortness of breath. Denies abdominal pain. Denies any urine or bowel changes. She states she is 7 months pregnant. Denies any vaginal bleeding or loss of fluid. She states she is taking prenatal vitamins. No sick contacts. No recent travel. Rest of complete systems reviewed and negative    Primary Care Provider: Andrey Roger MD   Specialist:    Past Medical History:   Past Medical History:   Diagnosis Date    Asthma     Head ache     Iron (Fe) deficiency anemia     Sickle cell trait ([facility] [address]     Sickle cell trait ([facility] [address]         Past Surgical History:   Past Surgical History:   Procedure Laterality Date    HX  SECTION          Social History:   Social History     Tobacco Use    Smoking status: Never Smoker    Smokeless tobacco: Never Used   Substance Use Topics    Alcohol use: No    Drug use: No        Allergies:   No Known Allergies     REVIEW OF SYSTEMS:  Review of Systems      PHYSICAL EXAM:  Vitals:    21 0604   BP: 122/74   Pulse: 88   Resp: 18   Temp: 98.1 °F (36.7 °C)   SpO2: 100%   Weight: 88.5 kg (195 lb)   Height: 5' 3\" (1.6 m)       Physical Exam   Vital signs reviewed. Alert oriented x 3 in NAD. HEENT: normocephalic atraumatic. Eyes are PERRLA EOMI. Conjunctiva normal.    External ears and nose normal.    Oropharynx with posterior erythema. Midline uvula.   No tongue elevation. Mildly enlarged bilateral tonsils. No exudate. Neck: normal external exam. No midline neck or back TTP. Lungs are clear to ascultation bilaterally. normal effort  Heart is regular rate and rhythm with no murmurs. Abdomen soft and nontender. No rebound rigidity or guarding. Extremities: Moves all 4 extremities and no distress. Full range of motion. 2+ pulses and BCR in all 4 extremities. Neuro: Normal gait. 5 out of 5 strength in all 4 extremities. No facial droop. Skin examination: intact. no rashes. No petechia or purpura. Medications - No data to display    RESULTS:    Labs -   Labs Reviewed   SARS-COV-2       Radiologic Studies -  No results found. MEDICAL DECISION MAKING    At this point no signs or symptoms of Kofi's angina or peritonsillar abscess. However we will go ahead and treat her with antibiotics as well as send off Covid swab. Patient aware test takes about 2 days to come back. Patient will follow-up on \"my chart\" to get the results. Self-isolation for 14 days explained to patient. Patient states understanding. Precaution explained. Patient states understanding and agrees with plan. Abdomen soft and nontender. No signs or symptoms of ectopic pregnancy. Patient to follow-up with her doctor. Results and precautions explained. Patient states understanding and agrees with plan. Patient has no new complaints, changes, or physical findings. Results were reviewed with the patient. Pt's questions and concerns were addressed. Care plan was outlined, including follow-up with PCP/specialist and return precautions were discussed. Patient is felt to be stable for discharge at this time. Diagnosis   Clinical Impression:   1. Pharyngitis, unspecified etiology    2. Less than 8 weeks gestation of pregnancy    3.  Encounter for laboratory testing for COVID-19 virus           Follow-up Information     Follow up With Specialties Details Why Contact Info Jaiden An MD [facility] Medicine Call today  [address] [facility] [address] [phone]      [facility] EMERGENCY DEPT Emergency Medicine Go in 2 days If symptoms worsen, As needed [address]  [phone]          Current Discharge Medication List      START taking these medications    Details   amoxicillin-clavulanate (Augmentin) 875-125 mg per tablet Take 1 Tab by mouth two (2) times a day for 10 days. Qty: 20 Tab, Refills: 0             Discharged in stable and improved condition. This chart was completed using Dragon, a dictation transcription service. Errors may have resulted from using this device.

## 2021-02-16 NOTE — Clinical Note
[facility] EMERGENCY DEPT 
[address] 
[address] [facility] [address] [phone] Work/School Note Date: 2/16/2021 To Whom It May concern: 
 
Jennifer Quiñones was seen and treated today in the emergency room by the following provider(s): 
Attending Provider: Michelle Mejia MD.   
 
Jennifer Quiñones is excused from work/school on 02/16/21 and 02/17/21. She is medically clear to return to work/school on 2/18/2021.   
 
 
Sincerely, 
 
 
 
 
Alysha Issa MD

## 2021-02-17 LAB — SARS-COV-2, COV2NT: NOT DETECTED
